# Patient Record
Sex: FEMALE | Race: WHITE | Employment: OTHER | URBAN - METROPOLITAN AREA
[De-identification: names, ages, dates, MRNs, and addresses within clinical notes are randomized per-mention and may not be internally consistent; named-entity substitution may affect disease eponyms.]

---

## 2020-10-19 ENCOUNTER — TELEPHONE (OUTPATIENT)
Dept: CARDIOLOGY | Age: 85
End: 2020-10-19

## 2020-10-20 NOTE — TELEPHONE ENCOUNTER
Patient called with BP still elevated. She took lisinopril 40 mg as advised. She also notes nausea, diarrhea and HA. Patient advised to come to ED for evaluation.       Araseli Cash NP

## 2022-07-29 ENCOUNTER — TELEPHONE (OUTPATIENT)
Dept: CARDIOLOGY CLINIC | Age: 87
End: 2022-07-29

## 2022-07-29 NOTE — TELEPHONE ENCOUNTER
Spoke to pt and pt's daughter about Dr. Maris Dailey' response. They thanked me and verbalized understanding.

## 2022-07-29 NOTE — TELEPHONE ENCOUNTER
----- Message from Rosie Saenz MD sent at 7/24/2022 10:21 PM EDT -----  Moderate bilateral ICA plaque but nothing to worry about.   Keep routine follow-up.  ----- Message -----  From: Raya Christensen DO  Sent: 7/23/2022   3:30 PM EDT  To: Rosie Saenz MD

## 2022-09-05 ASSESSMENT — ENCOUNTER SYMPTOMS
SORE THROAT: 0
ABDOMINAL PAIN: 0
CONSTIPATION: 0
DIARRHEA: 0
PHOTOPHOBIA: 0
ABDOMINAL DISTENTION: 0
SHORTNESS OF BREATH: 0
COUGH: 0

## 2022-09-05 NOTE — PROGRESS NOTES
[facility] CARDIOLOGY  [address], [address]  PHONE: [phone]        NAME:  Dennis Wilson  : 1932  MRN: [id number]     PCP:  Lydia Moura MD, MD      SUBJECTIVE:   Dennis Wilson is a 80 y.o. female seen for a follow up visit regarding the following:     Chief Complaint   Patient presents with    Coronary Artery Disease       HPI:    Doing well since our last office visit, without angina,CHF, edema, palpitations, presyncope or syncope. Blood pressure excellent today, still with intermittent postural symptoms but very mild severity and not frequent. Emphasized staying well-hydrated on a daily basis. Tolerating meds otherwise very well thus far. Haivng intermittent PVC's historically but clinically asymptomatic. Carotid duplex last year showed moderate right sided and mild left sided ICA disease with severe ECA stenosis (medical mgmt for this). No significant change 2020 and 2022 duplex. Echo 21:    LA: Left Atrium volume index is 32 mL/m2. TV: Mild tricuspid valve regurgitation is present. Right Ventricular Arterial Pressure (RVSP) is 25 mmHg. Pulmonary hypertension not suggested by Doppler findings. LV: Estimated LVEF is 60 - 65%. Normal cavity size, wall thickness and systolic function (ejection fraction normal). Wall motion: normal. Left ventricular diastolic dysfunction. MV: Mitral valve thickening. Mild mitral annular calcification. Mild mitral valve regurgitation is present. PV: Mild pulmonic valve regurgitation is present. Past Medical History, Past Surgical History, Family history, Social History, and Medications were all reviewed with the patient today and updated as necessary. Current Outpatient Medications   Medication Sig Dispense Refill    gabapentin (NEURONTIN) 100 MG capsule Take 100 mg by mouth 3 times daily.       aspirin 81 MG EC tablet Take by mouth daily      vitamin D 25 MCG (1000 UT) CAPS Take by mouth daily      Coenzyme Q10 100 MG TABS Take 200 mg by mouth      docusate (COLACE, DULCOLAX) 100 MG CAPS Take 100 mg by mouth 2 times daily      levothyroxine (SYNTHROID) 175 MCG tablet Take 150 mcg by mouth every morning (before breakfast)      lisinopril (PRINIVIL;ZESTRIL) 40 MG tablet Take 40 mg by mouth daily      metoprolol succinate (TOPROL XL) 25 MG extended release tablet TAKE 1 TABLET BY MOUTH EVERY DAY      nitroGLYCERIN (NITROSTAT) 0.4 MG SL tablet Place 0.4 mg under the tongue      polyethylene glycol (GLYCOLAX) 17 GM/SCOOP powder Take 17 g by mouth daily      zolpidem (AMBIEN) 10 MG tablet Take by mouth. No current facility-administered medications for this visit. Allergies   Allergen Reactions    Hydrocodone Other (See Comments)    Morphine Other (See Comments)     PT HIGHLY SENSITIVE TO DOSAGE - LAST POST-OP ADMIN NARCAN     Tramadol Other (See Comments)    Penicillins Itching and Rash    Sulfa Antibiotics Itching and Rash       Patient Active Problem List    Diagnosis Date Noted    S/P coronary artery stent placement 06/07/2016     Overview Note:     2.25 x 18 [place] 2010         Bilateral carotid artery stenosis without cerebral infarction 06/07/2016    PVC's (premature ventricular contractions) 05/02/2016    Cerebrovascular disease 05/02/2016    Chronic diastolic heart failure ([facility] [address] 05/02/2016    Bradycardia 05/02/2016    Hyperlipidemia 05/02/2016    Pulmonary nodules 04/07/2014     Overview Note:     CT-4/4/2014-  1. Irregular density in the superior aspect of the right lower lobe is   likely   an area of scarring or atelectasis. 2. 4 mm left lower lobe nodule and small pleural-based 5 mm right upper   lobe   nodule. A followup noncontrast CT is recommended in 6-12 months to   evaluate for   Stability. CT-10/6/2014-no change in nodules  CT Scan-10/07/2015-  1. Gross  stability  of  prior  noncalcified  nodules.   However,    multiple  new focal parenchymal  nodular  opacities  are  seen  although    some  imaging  features  Suggest that  these  are  inflammatory. The    largest  is  seen  in  the  right  lung apex measuring  11  mm. Based  on    the  size  and  the  presumed  inflammatory  etiology,followup  would  be    recommended  with  a  noncontrast  CT  scan  of  the  chest  in  3 months    to  ensure  improvement  or  Resolution. CT-1/15/2016-  Stable chronic changes in both lungs and resolution of the  inflammatory process in the right upper lobe. Abnormal pulmonary function test 04/07/2014     Overview Note:     The patient has a combined obstructive and restrictive defect on   spirometry. The flow volume loop has a normal pattern. The patient is   totally asymptomatic has never smoked. Colostomy care ([facility] [address] 04/07/2014     Overview Note:     2010-colectomy with abscess formation, mesh, multiple drains-prolonged   hospitalization [facility] and [facility]. Hypothyroid 66/41/6930    Diastolic dysfunction 51/17/8737    Essential hypertension, benign 11/25/2010    Coronary atherosclerosis of native coronary artery 11/25/2010    Paroxysmal ventricular tachycardia ([facility] [address] 11/25/2010        Past Surgical History:   Procedure Laterality Date    APPENDECTOMY      CARDIAC CATHETERIZATION  2010    GYN      HYSTERECTOMY    HEMORRHOID SURGERY      OTHER SURGICAL HISTORY      hemmoriodectomy    [place] ABDOMEN SURGERY PROC UNLISTED  2012, 2013    intestines     [place] ABDOMEN SURGERY PROC UNLISTED  2011    colon (multiple)    [place] CARDIAC SURG PROCEDURE UNLIST  2010    stent       Family History   Problem Relation Age of Onset    Post-op Cognitive Dysfunction Neg Hx     Other Neg Hx     Emergence Delirium Neg Hx     Post-op Nausea/Vomiting Neg Hx     Delayed Awakening Neg Hx     Pseudochol.  Deficiency Neg Hx     Malig Hypertherm Neg Hx         Social History     Tobacco Use    Smoking status: Never    Smokeless tobacco: Never   Substance Use Topics Alcohol use: No       ROS:    Review of Systems   Constitutional:  Negative for appetite change, chills, diaphoresis and fatigue. HENT:  Negative for congestion, mouth sores, nosebleeds, sore throat and tinnitus. Eyes:  Negative for photophobia and visual disturbance. Respiratory:  Negative for cough and shortness of breath. Cardiovascular:  Negative for chest pain, palpitations and leg swelling. Gastrointestinal:  Negative for abdominal distention, abdominal pain, constipation and diarrhea. Endocrine: Negative for cold intolerance, heat intolerance, polydipsia and polyuria. Genitourinary:  Negative for dysuria and hematuria. Musculoskeletal:  Negative for arthralgias, joint swelling and myalgias. Skin:  Negative for rash. Allergic/Immunologic: Negative for environmental allergies and food allergies. Neurological:  Negative for dizziness, seizures, syncope and light-headedness. Hematological:  Negative for adenopathy. Does not bruise/bleed easily. Psychiatric/Behavioral:  Negative for agitation, behavioral problems, dysphoric mood and hallucinations. The patient is not nervous/anxious. PHYSICAL EXAM:     Vitals:    09/08/22 1130   BP: 136/78   Pulse: 61   Weight: 129 lb (58.5 kg)   Height: 5' 2\" (1.575 m)      Wt Readings from Last 3 Encounters:   09/08/22 129 lb (58.5 kg)   07/19/22 137 lb (62.1 kg)   01/04/22 137 lb (62.1 kg)      BP Readings from Last 3 Encounters:   09/08/22 136/78   07/19/22 (!) 148/86   01/04/22 (!) 155/85        Physical Exam  Constitutional:       Appearance: Normal appearance. She is normal weight. HENT:      Head: Normocephalic and atraumatic. Nose: Nose normal.      Mouth/Throat:      Mouth: Mucous membranes are moist.      Pharynx: Oropharynx is clear. Eyes:      Extraocular Movements: Extraocular movements intact. Pupils: Pupils are equal, round, and reactive to light. Neck:      Vascular: Carotid bruit (high pitched left bruit) present. No JVD. Cardiovascular:      Rate and Rhythm: Normal rate and regular rhythm. Heart sounds: Murmur (soft MADELINE RUSB) heard. No friction rub. No gallop. Pulmonary:      Effort: Pulmonary effort is normal.      Breath sounds: Normal breath sounds. No wheezing or rhonchi. Abdominal:      General: Abdomen is flat. Bowel sounds are normal. There is no distension. Palpations: Abdomen is soft. Tenderness: There is no abdominal tenderness. Musculoskeletal:         General: No swelling. Normal range of motion. Cervical back: Normal range of motion and neck supple. No tenderness. Skin:     General: Skin is warm and dry. Neurological:      General: No focal deficit present. Mental Status: She is alert and oriented to person, place, and time. Mental status is at baseline. Psychiatric:         Mood and Affect: Mood normal.         Behavior: Behavior normal.        Medical problems and test results were reviewed with the patient today. No results found for: CHOL  No results found for: TRIG  No results found for: HDL  No results found for: LDLCHOLESTEROL, LDLCALC  No results found for: LABVLDL, VLDL  No results found for: CHOLHDLRATIO     No results found for: NA, K, CL, CO2, BUN, CREATININE, GLUCOSE, CALCIUM      No results for input(s): WBC, HGB, HCT, MCV, PLT in the last 720 hours. No results found for: LABA1C  No results found for: EAG     No results found for: BNP     No results found for: TSHFT4, TSH     Results for orders placed or performed in visit on 09/08/22   EKG 12 Lead    Impression    Sinus  Rhythm 61bpm  Normal axis  -Right bundle branch block. NSSTTW changes        ASSESSMENT and PLAN        Diagnoses and all orders for this visit:      Coronary artery disease - stable, no angina or CHF, continue meds      Essential hypertension, benign-today and stable at home per her report, minimize salt.  Walk daily for exercise      Chronic diastolic heart failure ([facility] [address] -

## 2022-09-08 ENCOUNTER — OFFICE VISIT (OUTPATIENT)
Dept: CARDIOLOGY CLINIC | Age: 87
End: 2022-09-08
Payer: MEDICARE

## 2022-09-08 VITALS
SYSTOLIC BLOOD PRESSURE: 136 MMHG | HEIGHT: 62 IN | BODY MASS INDEX: 23.74 KG/M2 | HEART RATE: 61 BPM | DIASTOLIC BLOOD PRESSURE: 78 MMHG | WEIGHT: 129 LBS

## 2022-09-08 DIAGNOSIS — I49.3 PVC'S (PREMATURE VENTRICULAR CONTRACTIONS): ICD-10-CM

## 2022-09-08 DIAGNOSIS — I65.23 BILATERAL CAROTID ARTERY STENOSIS WITHOUT CEREBRAL INFARCTION: ICD-10-CM

## 2022-09-08 DIAGNOSIS — I50.32 CHRONIC DIASTOLIC HEART FAILURE (HCC): Primary | ICD-10-CM

## 2022-09-08 DIAGNOSIS — E78.2 MIXED HYPERLIPIDEMIA: ICD-10-CM

## 2022-09-08 DIAGNOSIS — Z95.5 S/P CORONARY ARTERY STENT PLACEMENT: ICD-10-CM

## 2022-09-08 DIAGNOSIS — I25.10 ATHEROSCLEROSIS OF NATIVE CORONARY ARTERY OF NATIVE HEART WITHOUT ANGINA PECTORIS: ICD-10-CM

## 2022-09-08 DIAGNOSIS — I10 ESSENTIAL HYPERTENSION, BENIGN: ICD-10-CM

## 2022-09-08 PROCEDURE — 1090F PRES/ABSN URINE INCON ASSESS: CPT | Performed by: INTERNAL MEDICINE

## 2022-09-08 PROCEDURE — 99214 OFFICE O/P EST MOD 30 MIN: CPT | Performed by: INTERNAL MEDICINE

## 2022-09-08 PROCEDURE — G8427 DOCREV CUR MEDS BY ELIG CLIN: HCPCS | Performed by: INTERNAL MEDICINE

## 2022-09-08 PROCEDURE — G8420 CALC BMI NORM PARAMETERS: HCPCS | Performed by: INTERNAL MEDICINE

## 2022-09-08 PROCEDURE — 93000 ELECTROCARDIOGRAM COMPLETE: CPT | Performed by: INTERNAL MEDICINE

## 2022-09-08 PROCEDURE — 1036F TOBACCO NON-USER: CPT | Performed by: INTERNAL MEDICINE

## 2022-09-08 PROCEDURE — 1123F ACP DISCUSS/DSCN MKR DOCD: CPT | Performed by: INTERNAL MEDICINE

## 2022-09-08 RX ORDER — GABAPENTIN 100 MG/1
100 CAPSULE ORAL 3 TIMES DAILY
COMMUNITY

## 2023-04-25 ENCOUNTER — TELEPHONE (OUTPATIENT)
Dept: CARDIOLOGY CLINIC | Age: 88
End: 2023-04-25

## 2023-04-25 NOTE — TELEPHONE ENCOUNTER
Monet from [address] [place] [address] called on behalf of the the pt to report that the pt's //?. HR 62.  No SOB or CP

## 2023-04-25 NOTE — TELEPHONE ENCOUNTER
Claudia Mccarthy, RN [facility] Coordinator reports pt c/o high BP. Pt saw PCP yesterday. PCP ordered HHN. Doxycycline script for URI. VS in office 189/94/62  Advise keep VS Log x 1 week, 3 hrs after meds. Hydrate with 48-60 oz fluids daily, watch salt intake. Opening in EA tomorrow but since pt saw PCP yesterday, advise later f/u. Kathie agrees. Will review recs with pt, and return call with VS ~ 1wk. She will request [facility] also check pt's VS.   Dr. Jason Vigil may adjust meds based on VS log until he can see pt for FU. Kathie voiced understanding and thanks.    FU 6/23/23 11a Dr. Jason Vigil, TAYLOR. cgh

## 2023-04-27 ENCOUNTER — TELEPHONE (OUTPATIENT)
Dept: CARDIOLOGY CLINIC | Age: 88
End: 2023-04-27

## 2023-04-27 RX ORDER — VALSARTAN 80 MG/1
160 TABLET ORAL DAILY
COMMUNITY

## 2023-04-27 NOTE — TELEPHONE ENCOUNTER
Pt c/o elevated BP x ~1 wk. Slight headache. Denies CP, sob, edema. Pt c/o back pain from compression fx.     4/24  195/71/71  4/25  194/80/68 Pt took extra 1/2 Toprol 25mg  4/26  176/62/62  158/58/72  4/27  190/66/69    4/17/23 partial bowel obstruction  4/24/23 URI. Doxycycline. MEDS  TOPROL 25mg at night  VALSARTAN 80mg AM       UCD f/u 6/21/23 EA.   cgh

## 2023-04-27 NOTE — TELEPHONE ENCOUNTER
Dixie Browning, pt care manager with [facility] states that the pt called her and said her BP was 190/66. States that the pt has been having spikes all week, the pt has the other BP written down.  States the pt denies CP and SOB

## 2023-04-27 NOTE — TELEPHONE ENCOUNTER
Informed pt of Dr. Dimple Vallecillo' response. Pt voiced understanding with verb recall. Informed Lillie RN, [facility] Coordinator of call from pt, Dr. Dimple Vallecillo' response. Triage will talk to pt Tues for update. Kathie voiced understanding and thanks.  She will f/u with pt as well. cgh

## 2023-04-27 NOTE — TELEPHONE ENCOUNTER
Pain from bowel obstruction may be driving blood pressure. Stay on current dose of metoprolol as heart rate is well controlled but take valsartan 160 mg each morning and call me Monday with update on resting afternoon blood pressure and heart rate. Make sure she is sitting resting and relaxed when she checks it. Do not check vitals if in pain. Will be falsely elevated.

## 2023-05-02 ENCOUNTER — TELEPHONE (OUTPATIENT)
Dept: CARDIOLOGY CLINIC | Age: 88
End: 2023-05-02

## 2023-05-02 RX ORDER — AMLODIPINE BESYLATE 5 MG/1
5 TABLET ORAL DAILY
Qty: 90 TABLET | Refills: 3 | Status: SHIPPED | OUTPATIENT
Start: 2023-05-02

## 2023-05-02 NOTE — TELEPHONE ENCOUNTER
Add norvasc 5mg daily to current valsartan dosing  Take first dose today, then AM dosing  Allow 3-4 days to reach full effect. Minimize salt and caffeine  Recehck BP in 4-5 days after lunch after 5min rest.  Call with results of 4-05 days.

## 2023-05-02 NOTE — TELEPHONE ENCOUNTER
Called and informed pt of Dr. Florina Treadwell' response. Pt thanked and voiced understanding of instructions.

## 2023-05-02 NOTE — TELEPHONE ENCOUNTER
Called pt to check status. Had committed to pt call today. Pt states Dr. Josse Philip ordered another BP medication to take in addition to current meds. Someone from [facility] called and reviewed orders with her. She's wrenched her back again and the pain is causing her BP to be up. No further action at this time.  Pt voiced thanks for call.  cgh

## 2023-05-02 NOTE — TELEPHONE ENCOUNTER
Most recent BP readings:    4/28  189/84  4/29  180/55  5/1  174/59  5/2  207/58 Refuses ED, asx. All readings taken 3 hours after morning medications. Taking 2 valsartan pills as instructed.

## 2023-06-17 ASSESSMENT — ENCOUNTER SYMPTOMS
ABDOMINAL PAIN: 0
SORE THROAT: 0
SHORTNESS OF BREATH: 0
CONSTIPATION: 0
ABDOMINAL DISTENTION: 0
COUGH: 0
DIARRHEA: 0
PHOTOPHOBIA: 0

## 2023-06-21 ENCOUNTER — OFFICE VISIT (OUTPATIENT)
Age: 88
End: 2023-06-21
Payer: MEDICARE

## 2023-06-21 VITALS
SYSTOLIC BLOOD PRESSURE: 134 MMHG | DIASTOLIC BLOOD PRESSURE: 80 MMHG | HEART RATE: 64 BPM | BODY MASS INDEX: 22.82 KG/M2 | WEIGHT: 124 LBS | HEIGHT: 62 IN

## 2023-06-21 DIAGNOSIS — I65.23 BILATERAL CAROTID ARTERY STENOSIS WITHOUT CEREBRAL INFARCTION: ICD-10-CM

## 2023-06-21 DIAGNOSIS — I10 ESSENTIAL HYPERTENSION, BENIGN: ICD-10-CM

## 2023-06-21 DIAGNOSIS — I50.32 CHRONIC DIASTOLIC HEART FAILURE (HCC): Primary | ICD-10-CM

## 2023-06-21 DIAGNOSIS — R00.1 BRADYCARDIA: ICD-10-CM

## 2023-06-21 DIAGNOSIS — I47.29 PAROXYSMAL VENTRICULAR TACHYCARDIA (HCC): ICD-10-CM

## 2023-06-21 DIAGNOSIS — I49.3 PVC'S (PREMATURE VENTRICULAR CONTRACTIONS): ICD-10-CM

## 2023-06-21 DIAGNOSIS — I25.10 ATHEROSCLEROSIS OF NATIVE CORONARY ARTERY OF NATIVE HEART WITHOUT ANGINA PECTORIS: ICD-10-CM

## 2023-06-21 PROCEDURE — 99214 OFFICE O/P EST MOD 30 MIN: CPT | Performed by: INTERNAL MEDICINE

## 2023-06-21 PROCEDURE — G8427 DOCREV CUR MEDS BY ELIG CLIN: HCPCS | Performed by: INTERNAL MEDICINE

## 2023-06-21 PROCEDURE — G8420 CALC BMI NORM PARAMETERS: HCPCS | Performed by: INTERNAL MEDICINE

## 2023-06-21 PROCEDURE — 1090F PRES/ABSN URINE INCON ASSESS: CPT | Performed by: INTERNAL MEDICINE

## 2023-06-21 PROCEDURE — 1036F TOBACCO NON-USER: CPT | Performed by: INTERNAL MEDICINE

## 2023-06-21 PROCEDURE — 1123F ACP DISCUSS/DSCN MKR DOCD: CPT | Performed by: INTERNAL MEDICINE

## 2023-06-21 PROCEDURE — 93000 ELECTROCARDIOGRAM COMPLETE: CPT | Performed by: INTERNAL MEDICINE

## 2023-06-21 RX ORDER — BUSPIRONE HYDROCHLORIDE 10 MG/1
10 TABLET ORAL 2 TIMES DAILY PRN
COMMUNITY
Start: 2023-06-05

## 2023-06-21 RX ORDER — HYDROCODONE BITARTRATE AND ACETAMINOPHEN 5; 325 MG/1; MG/1
1-2 TABLET ORAL 2 TIMES DAILY PRN
COMMUNITY
Start: 2023-05-25

## 2023-08-11 ENCOUNTER — TELEPHONE (OUTPATIENT)
Age: 88
End: 2023-08-11

## 2023-08-11 NOTE — TELEPHONE ENCOUNTER
Pt c/o BLE edema. Discussed with PCP, who recommended pt stop amlodipine and increase metoprolol to 50 mg daily. Pt concerned about this as she states Dr. Maura Deshpande just finally got her BP under control and doesn't want to mess things up. Currently taking amlodipine 2.5 mg daily, metoprolol 25 mg daily, valsartan 160 mg daily.

## 2023-08-11 NOTE — TELEPHONE ENCOUNTER
Please call patient re: she has swelling in her feet. She went to PCP and he wanted to change her BP medication. She wants to know if Dr Nhan Ramires is okay with that.

## 2023-08-11 NOTE — TELEPHONE ENCOUNTER
I think it is fine to try that regimen recommended by her family doctor but if her heart rate drops below 50 bpm she needs to go back to what she is taking right now. Elevate legs whenever resting, minimize sodium, hold amlodipine for now.

## 2023-12-03 ASSESSMENT — ENCOUNTER SYMPTOMS
ABDOMINAL PAIN: 0
SORE THROAT: 0
DIARRHEA: 0
PHOTOPHOBIA: 0
SHORTNESS OF BREATH: 0
CONSTIPATION: 0
ABDOMINAL DISTENTION: 0
COUGH: 0

## 2023-12-03 NOTE — PROGRESS NOTES
Behavior: Behavior normal.          Medical problems and test results were reviewed with the patient today. No results found for: \"CHOL\"  No results found for: \"TRIG\"  No results found for: \"HDL\"  No results found for: \"LDLCHOLESTEROL\", \"LDLCALC\"  No results found for: \"LABVLDL\", \"VLDL\"  No results found for: \"CHOLHDLRATIO\"     No results found for: \"NA\", \"K\", \"CL\", \"CO2\", \"BUN\", \"CREATININE\", \"GLUCOSE\", \"CALCIUM\"      No results for input(s): \"WBC\", \"HGB\", \"HCT\", \"MCV\", \"PLT\" in the last 720 hours. No results found for: \"LABA1C\"  No results found for: \"EAG\"     No results found for: \"BNP\"     No results found for: \"TSHFT4\", \"TSH\"     Results for orders placed or performed in visit on 12/06/23   EKG 12 Lead    Impression    Sinus  Rhythm 75 bpm-First degree A-V block  - frequent multiform ectopic ventricular beats   Liliana = 226   # VECs = 4, # types 2  -Right bundle branch block.    -Left atrial enlargement. Poor R wave progression  Nonspecific ST-T wave changes with marked baseline wander and artifact        ASSESSMENT and PLAN        Diagnoses and all orders for this visit:      Coronary artery disease - stable, no angina or CHF, continue meds      Essential hypertension, benign-consistently moderately elevated. Severe swelling with amlodipine. Increase valsartan to 160 mg twice daily, check BMP and magnesium in 7 to 10 days and call me in a couple of weeks with an update on home BP. Permissive hypertension in the 140s fine with me given her extreme age. Chronic diastolic heart failure (HCC) - stable, continue meds/low salt diet. Elevate legs and minimize sodium. PVC's (premature ventricular contractions) - stable, clinically asymptomatic since her last visit, clinically asymptomatic- continue meds, call with worsening palpitations. Acquired hypothyroidism- stable, continue meds      Mixed hyperlipidemia-healthy diet encouraged. Previously on rosuvastatin.   Conservative therapy

## 2023-12-06 ENCOUNTER — OFFICE VISIT (OUTPATIENT)
Age: 88
End: 2023-12-06
Payer: MEDICARE

## 2023-12-06 VITALS
DIASTOLIC BLOOD PRESSURE: 84 MMHG | WEIGHT: 124 LBS | HEIGHT: 62 IN | SYSTOLIC BLOOD PRESSURE: 168 MMHG | HEART RATE: 75 BPM | BODY MASS INDEX: 22.82 KG/M2

## 2023-12-06 DIAGNOSIS — I65.23 BILATERAL CAROTID ARTERY STENOSIS WITHOUT CEREBRAL INFARCTION: ICD-10-CM

## 2023-12-06 DIAGNOSIS — I50.32 CHRONIC DIASTOLIC HEART FAILURE (HCC): Primary | ICD-10-CM

## 2023-12-06 DIAGNOSIS — I25.10 ATHEROSCLEROSIS OF NATIVE CORONARY ARTERY OF NATIVE HEART WITHOUT ANGINA PECTORIS: ICD-10-CM

## 2023-12-06 DIAGNOSIS — I49.3 PVC'S (PREMATURE VENTRICULAR CONTRACTIONS): ICD-10-CM

## 2023-12-06 DIAGNOSIS — I10 ESSENTIAL HYPERTENSION, BENIGN: ICD-10-CM

## 2023-12-06 PROCEDURE — 99214 OFFICE O/P EST MOD 30 MIN: CPT | Performed by: INTERNAL MEDICINE

## 2023-12-06 PROCEDURE — G8484 FLU IMMUNIZE NO ADMIN: HCPCS | Performed by: INTERNAL MEDICINE

## 2023-12-06 PROCEDURE — G8420 CALC BMI NORM PARAMETERS: HCPCS | Performed by: INTERNAL MEDICINE

## 2023-12-06 PROCEDURE — 1123F ACP DISCUSS/DSCN MKR DOCD: CPT | Performed by: INTERNAL MEDICINE

## 2023-12-06 PROCEDURE — G8427 DOCREV CUR MEDS BY ELIG CLIN: HCPCS | Performed by: INTERNAL MEDICINE

## 2023-12-06 PROCEDURE — 93000 ELECTROCARDIOGRAM COMPLETE: CPT | Performed by: INTERNAL MEDICINE

## 2023-12-06 PROCEDURE — 1036F TOBACCO NON-USER: CPT | Performed by: INTERNAL MEDICINE

## 2023-12-06 PROCEDURE — 1090F PRES/ABSN URINE INCON ASSESS: CPT | Performed by: INTERNAL MEDICINE

## 2023-12-06 RX ORDER — VALSARTAN 160 MG/1
160 TABLET ORAL 2 TIMES DAILY
Qty: 180 TABLET | Refills: 3 | Status: SHIPPED | OUTPATIENT
Start: 2023-12-06

## 2023-12-06 RX ORDER — METOPROLOL SUCCINATE 25 MG/1
25 TABLET, EXTENDED RELEASE ORAL DAILY
Qty: 90 TABLET | Refills: 3 | Status: SHIPPED | OUTPATIENT
Start: 2023-12-06

## 2024-01-03 NOTE — TELEPHONE ENCOUNTER
----- Message from Jc Solis MD sent at 1/3/2024 11:10 AM EST -----  BP log reviewed.  A bit high but overall getting close to where I need it.  Continue current meds, stay hydrated but minimize sodium.  Add amlodipine 2.5 mg each morning and keep routine follow-up.  This should do the trick.

## 2024-01-04 RX ORDER — AMLODIPINE BESYLATE 2.5 MG/1
2.5 TABLET ORAL EVERY MORNING
Qty: 90 TABLET | Refills: 3 | Status: SHIPPED | OUTPATIENT
Start: 2024-01-04

## 2024-01-04 NOTE — TELEPHONE ENCOUNTER
Requested Prescriptions     Pending Prescriptions Disp Refills    amLODIPine (NORVASC) 2.5 MG tablet 90 tablet 3     Sig: Take 1 tablet by mouth every morning

## 2024-06-12 ASSESSMENT — ENCOUNTER SYMPTOMS
ABDOMINAL DISTENTION: 0
SHORTNESS OF BREATH: 0
ABDOMINAL PAIN: 0
CONSTIPATION: 0
PHOTOPHOBIA: 0
DIARRHEA: 0
COUGH: 0
SORE THROAT: 0

## 2024-06-12 NOTE — PROGRESS NOTES
The estimated RVSP is 32 mmHg.   Pulmonic Valve Valve structure is normal. Mild regurgitation. No stenosis noted.   Aorta Normal sized aortic root and ascending aorta.   Pericardium No pericardial effusion.     Past Medical History, Past Surgical History, Family history, Social History, and Medications were all reviewed with the patient today and updated as necessary.     Current Outpatient Medications   Medication Sig Dispense Refill    amLODIPine (NORVASC) 2.5 MG tablet Take 1 tablet by mouth every morning 90 tablet 3    metoprolol succinate (TOPROL XL) 25 MG extended release tablet Take 1 tablet by mouth daily TAKE 1 TABLET BY MOUTH EVERY DAY 90 tablet 3    valsartan (DIOVAN) 160 MG tablet Take 1 tablet by mouth 2 times daily 180 tablet 3    HYDROcodone-acetaminophen (NORCO) 5-325 MG per tablet Take 1-2 tablets by mouth 2 times daily as needed.      busPIRone (BUSPAR) 10 MG tablet Take 1 tablet by mouth 2 times daily as needed      gabapentin (NEURONTIN) 100 MG capsule Take 1 capsule by mouth 3 times daily.      aspirin 81 MG EC tablet Take by mouth daily      vitamin D 25 MCG (1000 UT) CAPS Take by mouth daily      Coenzyme Q10 100 MG TABS Take 200 mg by mouth      docusate (COLACE, DULCOLAX) 100 MG CAPS Take 100 mg by mouth 2 times daily      levothyroxine (SYNTHROID) 175 MCG tablet Take 150 mcg by mouth every morning (before breakfast)      nitroGLYCERIN (NITROSTAT) 0.4 MG SL tablet Place 1 tablet under the tongue      polyethylene glycol (GLYCOLAX) 17 GM/SCOOP powder Take 17 g by mouth daily      zolpidem (AMBIEN) 10 MG tablet Take by mouth.       No current facility-administered medications for this visit.            Allergies   Allergen Reactions    Hydrocodone Other (See Comments)    Morphine Other (See Comments)     PT HIGHLY SENSITIVE TO DOSAGE - LAST POST-OP ADMIN NARCAN     Tramadol Other (See Comments)    Penicillins Itching and Rash    Sulfa Antibiotics Itching and Rash       Patient Active Problem

## 2024-06-13 ENCOUNTER — OFFICE VISIT (OUTPATIENT)
Age: 89
End: 2024-06-13
Payer: MEDICARE

## 2024-06-13 VITALS
SYSTOLIC BLOOD PRESSURE: 182 MMHG | WEIGHT: 123.6 LBS | HEIGHT: 62 IN | HEART RATE: 65 BPM | DIASTOLIC BLOOD PRESSURE: 64 MMHG | BODY MASS INDEX: 22.74 KG/M2

## 2024-06-13 DIAGNOSIS — I49.3 PVC'S (PREMATURE VENTRICULAR CONTRACTIONS): ICD-10-CM

## 2024-06-13 DIAGNOSIS — I10 ESSENTIAL HYPERTENSION, BENIGN: ICD-10-CM

## 2024-06-13 DIAGNOSIS — I50.32 CHRONIC DIASTOLIC HEART FAILURE (HCC): Primary | ICD-10-CM

## 2024-06-13 DIAGNOSIS — I25.10 ATHEROSCLEROSIS OF NATIVE CORONARY ARTERY OF NATIVE HEART WITHOUT ANGINA PECTORIS: ICD-10-CM

## 2024-06-13 DIAGNOSIS — R00.1 BRADYCARDIA: ICD-10-CM

## 2024-06-13 DIAGNOSIS — I65.23 BILATERAL CAROTID ARTERY STENOSIS WITHOUT CEREBRAL INFARCTION: ICD-10-CM

## 2024-06-13 PROCEDURE — 99214 OFFICE O/P EST MOD 30 MIN: CPT | Performed by: INTERNAL MEDICINE

## 2024-06-13 PROCEDURE — 1123F ACP DISCUSS/DSCN MKR DOCD: CPT | Performed by: INTERNAL MEDICINE

## 2024-06-13 PROCEDURE — 1090F PRES/ABSN URINE INCON ASSESS: CPT | Performed by: INTERNAL MEDICINE

## 2024-06-13 PROCEDURE — 1036F TOBACCO NON-USER: CPT | Performed by: INTERNAL MEDICINE

## 2024-06-13 PROCEDURE — G8420 CALC BMI NORM PARAMETERS: HCPCS | Performed by: INTERNAL MEDICINE

## 2024-06-13 PROCEDURE — 93000 ELECTROCARDIOGRAM COMPLETE: CPT | Performed by: INTERNAL MEDICINE

## 2024-06-13 PROCEDURE — G8427 DOCREV CUR MEDS BY ELIG CLIN: HCPCS | Performed by: INTERNAL MEDICINE

## 2024-08-06 ENCOUNTER — TELEPHONE (OUTPATIENT)
Age: 89
End: 2024-08-06

## 2024-08-06 NOTE — TELEPHONE ENCOUNTER
Patient at 2 am this morning she woke up and thought someone was shaking her bed, when she sat up her heart was racing like she was frightened her BP was 199/98 she keep taking /54 at 6 am and it dropped some, this morning not feeling well, is short of breath and feet swelling.

## 2024-08-06 NOTE — TELEPHONE ENCOUNTER
Pt states she awakened frightened in the middle of the night and BP was elevated 199/82. Sat up for a while and BP went down to 164/54. Now BP is more normal 159/69. C/o some edema in feet. Pt states she ate some chinese food last night. Denies SOB. Triage informed pt BP may have been elevated due to being frightened and high sodium meal. Likewise increase in swelling may be from high sodium meal. Encouraged hydration with water, elevation of feet and call our office back if symptoms worsen or do not improve.

## 2024-12-04 RX ORDER — METOPROLOL SUCCINATE 25 MG/1
25 TABLET, EXTENDED RELEASE ORAL DAILY
Qty: 90 TABLET | Refills: 3 | Status: SHIPPED | OUTPATIENT
Start: 2024-12-04

## 2024-12-04 NOTE — TELEPHONE ENCOUNTER
MEDICATION REFILL REQUEST      Name of Medication:  metoprolol succinate ER  Dose:  25 mg  Frequency:  QD  Quantity:  90  Days' supply:  90 with 3 refills      Pharmacy Name/Location:  [phone]

## 2024-12-04 NOTE — TELEPHONE ENCOUNTER
Requested Prescriptions     Pending Prescriptions Disp Refills    metoprolol succinate (TOPROL XL) 25 MG extended release tablet 90 tablet 3     Sig: Take 1 tablet by mouth daily TAKE 1 TABLET BY MOUTH EVERY DAY     Verified rx. Last OV 06/13/24. Erx to pharm on file.

## 2024-12-14 NOTE — PROGRESS NOTES
[facility] CARDIOLOGY  [address] [place] [address], [address]  [place] [address]  PHONE: [phone]        NAME:  Nataliya Gonzales  : 1932  MRN: [id number]     PCP:  Curt Jiang MD      SUBJECTIVE:   Nataliya Gonzales is a 89 y.o. female seen for a follow up visit regarding the following:     Chief Complaint   Patient presents with    Chronic diastolic heart failure       HPI:    Doing well since our last office visit, without angina, CHF, presyncope or syncope but having mild increase in dependent lower extremity edema, worse on the right, likely at least partially caused by calcium channel blocker/amlodipine therapy/chronic venous insufficiency. Blood pressure higher and moderately elevated at home and here today after cessation of amlodipine but edema is completely resolved now.  Emphasized staying well-hydrated on a daily basis. Tolerating meds otherwise very well thus far. Heart rate in the 60s-70s consistently. Blood pressure 120-150's at home (home BP log reviewed today).  Avoiding calcium channel blockers but augmented ARB to twice daily dosing at last visit.       Carotid duplex 2022 showed moderate right sided and mild left sided ICA disease with severe ECA stenosis (medical mgmt for this).  No significant change 2020 and 2022 duplex.   Remains asymptomatic.      Echocardiogram 2023:  Left Ventricle Normal left ventricular systolic function with a visually estimated EF of 55 - 60%. Left ventricle size is normal. Normal wall thickness. Normal wall motion. Abnormal diastolic function.   Left Atrium Left atrium is mildly dilated.   Right Ventricle Right ventricle size is normal. Normal systolic function.   Right Atrium Right atrium size is normal.   Aortic Valve Mild sclerosis of the aortic valve cusp. No regurgitation. No significant stenosis.   Mitral Valve Valve structure is normal. Mild regurgitation. No stenosis noted.   Tricuspid Valve Valve

## 2024-12-17 ENCOUNTER — OFFICE VISIT (OUTPATIENT)
Age: 88
End: 2024-12-17
Payer: MEDICARE

## 2024-12-17 VITALS
WEIGHT: 118.6 LBS | DIASTOLIC BLOOD PRESSURE: 76 MMHG | HEIGHT: 65 IN | HEART RATE: 56 BPM | BODY MASS INDEX: 19.76 KG/M2 | SYSTOLIC BLOOD PRESSURE: 138 MMHG

## 2024-12-17 DIAGNOSIS — I47.29 PAROXYSMAL VENTRICULAR TACHYCARDIA (HCC): ICD-10-CM

## 2024-12-17 DIAGNOSIS — I10 ESSENTIAL HYPERTENSION, BENIGN: Primary | ICD-10-CM

## 2024-12-17 DIAGNOSIS — I49.3 PVC'S (PREMATURE VENTRICULAR CONTRACTIONS): ICD-10-CM

## 2024-12-17 DIAGNOSIS — I25.10 ATHEROSCLEROSIS OF NATIVE CORONARY ARTERY OF NATIVE HEART WITHOUT ANGINA PECTORIS: ICD-10-CM

## 2024-12-17 DIAGNOSIS — I65.23 BILATERAL CAROTID ARTERY STENOSIS WITHOUT CEREBRAL INFARCTION: ICD-10-CM

## 2024-12-17 DIAGNOSIS — I51.89 DIASTOLIC DYSFUNCTION: ICD-10-CM

## 2024-12-17 PROCEDURE — 1126F AMNT PAIN NOTED NONE PRSNT: CPT | Performed by: INTERNAL MEDICINE

## 2024-12-17 PROCEDURE — G8484 FLU IMMUNIZE NO ADMIN: HCPCS | Performed by: INTERNAL MEDICINE

## 2024-12-17 PROCEDURE — 1160F RVW MEDS BY RX/DR IN RCRD: CPT | Performed by: INTERNAL MEDICINE

## 2024-12-17 PROCEDURE — 99214 OFFICE O/P EST MOD 30 MIN: CPT | Performed by: INTERNAL MEDICINE

## 2024-12-17 PROCEDURE — G8420 CALC BMI NORM PARAMETERS: HCPCS | Performed by: INTERNAL MEDICINE

## 2024-12-17 PROCEDURE — G8427 DOCREV CUR MEDS BY ELIG CLIN: HCPCS | Performed by: INTERNAL MEDICINE

## 2024-12-17 PROCEDURE — 1090F PRES/ABSN URINE INCON ASSESS: CPT | Performed by: INTERNAL MEDICINE

## 2024-12-17 PROCEDURE — 1123F ACP DISCUSS/DSCN MKR DOCD: CPT | Performed by: INTERNAL MEDICINE

## 2024-12-17 PROCEDURE — 1159F MED LIST DOCD IN RCRD: CPT | Performed by: INTERNAL MEDICINE

## 2024-12-17 PROCEDURE — 1036F TOBACCO NON-USER: CPT | Performed by: INTERNAL MEDICINE

## 2024-12-17 RX ORDER — METOPROLOL SUCCINATE 25 MG/1
25 TABLET, EXTENDED RELEASE ORAL DAILY
Qty: 90 TABLET | Refills: 3 | Status: SHIPPED | OUTPATIENT
Start: 2024-12-17

## 2024-12-17 RX ORDER — VALSARTAN 160 MG/1
160 TABLET ORAL 2 TIMES DAILY
Qty: 180 TABLET | Refills: 3 | Status: SHIPPED | OUTPATIENT
Start: 2024-12-17

## 2024-12-17 RX ORDER — AMLODIPINE BESYLATE 2.5 MG/1
2.5 TABLET ORAL EVERY MORNING
Qty: 90 TABLET | Refills: 3 | Status: SHIPPED | OUTPATIENT
Start: 2024-12-17

## 2025-01-08 ENCOUNTER — TELEPHONE (OUTPATIENT)
Age: 89
End: 2025-01-08

## 2025-02-18 ENCOUNTER — TELEPHONE (OUTPATIENT)
Age: 89
End: 2025-02-18

## 2025-02-18 NOTE — TELEPHONE ENCOUNTER
Spoke to Shwetha, pt was started on Eliquis 2.5 BID Spironolactone 25mg daily, Metoprolol was increased from 25mg to 50mg daily. Pt is concerned with the medication changes made by the hospitalist. Pt is unsure why she need to start new medications. Please advise if/why pt needs to be on the medications.     Also hospital note indicates pt needs f/u. Please advise how soon pt needs to be seen.

## 2025-02-18 NOTE — TELEPHONE ENCOUNTER
Spoke to Shwetha and informed her of provider response.       Spoke with pt's daughter and scheduled f/u for 03/25/25.

## 2025-02-18 NOTE — TELEPHONE ENCOUNTER
I cannot see any recent notes.I am not sure Care Everywhere is working appropriately but it looks like she may have been in atrial fibrillation.  If she is feeling well and having no bleeding, bruising, chest pain, or palpitations I would certainly continue meds.  Stay well-hydrated and keep follow-up with me to discuss further.  If she feels that she is intolerant to any of these meds she needs to let me know

## 2025-03-23 NOTE — PROGRESS NOTES
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 The flow volume loop has a normal pattern. The patient is   totally asymptomatic has never smoked.        Colostomy care (HCC) 04/07/2014     Priority: Low     Overview Note:     2010-colectomy with abscess formation, mesh, multiple drains-prolonged   hospitalization [facility] and [facility].        Hypothyroid 04/07/2014     Priority: Low    Diastolic dysfunction 04/07/2014     Priority: Low    Essential hypertension, benign 11/25/2010     Priority: Low    Coronary atherosclerosis of native coronary artery 11/25/2010     Priority: Low    Paroxysmal ventricular tachycardia (HCC) 11/25/2010     Priority: Low        Past Surgical History:   Procedure Laterality Date    APPENDECTOMY      CARDIAC CATHETERIZATION  2010    GYN      HYSTERECTOMY    HEMORRHOID SURGERY      OTHER SURGICAL HISTORY      hemmoriodectomy    [place] UNLISTED PROCEDURE ABDOMEN PERITONEUM & OMENTUM  2012, 2013    intestines     IA UNLISTED PROCEDURE ABDOMEN PERITONEUM & OMENTUM  2011    colon (multiple)    [place] UNLISTED PROCEDURE CARDIAC SURGERY  2010    stent       Family History   Problem Relation Age of Onset    Post-op Cognitive Dysfunction Neg Hx     Other Neg Hx     Emergence Delirium Neg Hx     Post-op Nausea/Vomiting Neg Hx     Delayed Awakening Neg Hx     Pseudochol. Deficiency Neg Hx     Malig Hypertherm Neg Hx         Social History     Tobacco Use    Smoking status: Never    Smokeless tobacco: Never   Substance Use Topics    Alcohol use: No       ROS:    Review of Systems   Constitutional:  Negative for appetite change, chills, diaphoresis and fatigue.   HENT:  Negative for congestion, mouth sores, nosebleeds, sore throat and tinnitus.    Eyes:  Negative for photophobia and visual disturbance.   Respiratory:  Negative for cough and shortness of breath.    Cardiovascular:  Positive for leg swelling.

## 2025-03-25 ENCOUNTER — OFFICE VISIT (OUTPATIENT)
Age: 89
End: 2025-03-25
Payer: MEDICARE

## 2025-03-25 VITALS
SYSTOLIC BLOOD PRESSURE: 124 MMHG | BODY MASS INDEX: 22.69 KG/M2 | HEIGHT: 61 IN | HEART RATE: 84 BPM | DIASTOLIC BLOOD PRESSURE: 70 MMHG | WEIGHT: 120.2 LBS

## 2025-03-25 DIAGNOSIS — I49.3 PVC'S (PREMATURE VENTRICULAR CONTRACTIONS): ICD-10-CM

## 2025-03-25 DIAGNOSIS — I50.32 CHRONIC DIASTOLIC HEART FAILURE (HCC): ICD-10-CM

## 2025-03-25 DIAGNOSIS — I65.23 BILATERAL CAROTID ARTERY STENOSIS WITHOUT CEREBRAL INFARCTION: ICD-10-CM

## 2025-03-25 DIAGNOSIS — R00.1 BRADYCARDIA: ICD-10-CM

## 2025-03-25 DIAGNOSIS — I25.10 ATHEROSCLEROSIS OF NATIVE CORONARY ARTERY OF NATIVE HEART WITHOUT ANGINA PECTORIS: Primary | ICD-10-CM

## 2025-03-25 DIAGNOSIS — E78.2 MIXED HYPERLIPIDEMIA: ICD-10-CM

## 2025-03-25 DIAGNOSIS — I10 ESSENTIAL HYPERTENSION, BENIGN: ICD-10-CM

## 2025-03-25 PROCEDURE — 1159F MED LIST DOCD IN RCRD: CPT | Performed by: INTERNAL MEDICINE

## 2025-03-25 PROCEDURE — 1123F ACP DISCUSS/DSCN MKR DOCD: CPT | Performed by: INTERNAL MEDICINE

## 2025-03-25 PROCEDURE — G8420 CALC BMI NORM PARAMETERS: HCPCS | Performed by: INTERNAL MEDICINE

## 2025-03-25 PROCEDURE — 1160F RVW MEDS BY RX/DR IN RCRD: CPT | Performed by: INTERNAL MEDICINE

## 2025-03-25 PROCEDURE — 99214 OFFICE O/P EST MOD 30 MIN: CPT | Performed by: INTERNAL MEDICINE

## 2025-03-25 PROCEDURE — 1126F AMNT PAIN NOTED NONE PRSNT: CPT | Performed by: INTERNAL MEDICINE

## 2025-03-25 PROCEDURE — 1090F PRES/ABSN URINE INCON ASSESS: CPT | Performed by: INTERNAL MEDICINE

## 2025-03-25 PROCEDURE — G8427 DOCREV CUR MEDS BY ELIG CLIN: HCPCS | Performed by: INTERNAL MEDICINE

## 2025-03-25 PROCEDURE — 1036F TOBACCO NON-USER: CPT | Performed by: INTERNAL MEDICINE

## 2025-03-25 RX ORDER — FUROSEMIDE 20 MG/1
20 TABLET ORAL
COMMUNITY
Start: 2025-03-07

## 2025-05-20 ENCOUNTER — TELEPHONE (OUTPATIENT)
Age: 89
End: 2025-05-20

## 2025-05-20 NOTE — TELEPHONE ENCOUNTER
Minimize sodium intake, elevate legs whenever resting and relaxing  64 ounce fluid restriction daily  Increase Lasix to 40 mg every day, take a total of 40 mg this morning as well  Once swelling and shortness of breath is gone, go back to 20 mg daily maintenance dose, okay to increase to 40 mg daily on an as-needed basis in the future.  If this does not work and she continues to get worse she needs to go to the ER

## 2025-05-20 NOTE — TELEPHONE ENCOUNTER
Triage informed pt of Dr. Solis' response. She states she feels she is having good results from lasix 20mg today and it makes her so weak she does not feel she can take a higher dose. Currently she takes lasix 20mg 3x per week. Triage informed pt she may take lasix 20mg daily until swelling is gone and SOB is better. Pt will call our office back if needed and aware of ER precautions.

## 2025-05-20 NOTE — TELEPHONE ENCOUNTER
Pt calling in with concerns of bilateral feet swelling. Legs are slightly \"puffy\". States swelling does not go down at night. She had recent med changes end of Feb/beginning of March. When she was seen by Will in March he noted the changes were ok. She couldn't give me the details of changes. She thinks it was the Valsartan

## 2025-05-20 NOTE — TELEPHONE ENCOUNTER
Pt c/o pitting edema in feet and alf up calves with increased SOB.  Did not improve overnight; the worst that swelling has been.  Weight today is 120 lbs.  Took lasix 20mg this morning.  /57 HR 81    Asking if she should do anything differently.  Triage will review with Dr. Solis and call pt back with his response.

## 2025-06-11 ENCOUNTER — TELEPHONE (OUTPATIENT)
Age: 89
End: 2025-06-11

## 2025-06-11 NOTE — TELEPHONE ENCOUNTER
Pt states since she has started valsartan she is extremely tired and has noticed her feet are getting \"puffy\". Pt states she does take a fluid pill.

## 2025-06-11 NOTE — TELEPHONE ENCOUNTER
Hold the amlodipine for a week as well.  Maybe this will help mobilize some fluid and also give her a little more energy.  If she notices that she has more energy next week after holding amlodipine, just discontinue it completely

## 2025-06-11 NOTE — TELEPHONE ENCOUNTER
Patient called with Dr Solis' response. Patient voiced understanding and thanked me med list updated//brendab

## 2025-06-11 NOTE — TELEPHONE ENCOUNTER
Patient called stating that she has been having fatigue, SOB, feet a little \"puffy\". Keeps feet up when sitting, takes valsartan 160 mg twice a day, lasix 20 mg 3 times a week, amlodipine 2.5 mg, metoprolol 50 mg .Last took Lasix 2 days ago and plans to take it today. Patient feels that the Valsartan is making her have fatigue.   Blood pressure readings  124/78  152/76  122/74  157/52  162/67  Patient informed that per note on 5-20-25, she can take Lasix 40 mg until SOB and swelling is gone. Patient states that she does not feel that she can take Lasix 40 mg as she is very weak. Patient states that she will take her regular lasix 20 mg now.    Last office visit 3-25-25

## 2025-06-18 ENCOUNTER — TELEPHONE (OUTPATIENT)
Age: 89
End: 2025-06-18

## 2025-06-18 NOTE — TELEPHONE ENCOUNTER
Patient called stating she has the following issues :    Calling back for BB  See chart note 6/11    Please call and advise.

## 2025-06-18 NOTE — TELEPHONE ENCOUNTER
I doubt that this is anything cardiac related.  It could be medication induced (noncardiac meds).  I would recommend she discuss with her family physician.  Her blood pressure remains stable which may be a bit low for her age.  Stay off the amlodipine completely.  Continue monitoring blood pressure but try taking the [place] only once daily instead of twice daily.  If her pressure is 130s to 150s systolic I am okay with this at her age.  Otherwise continue meds as currently taking and discussed with PCP.

## 2025-06-18 NOTE — TELEPHONE ENCOUNTER
Patient called stating that she has continued to stay off  amlodipine, still feels weak and nauseated. Blood pressure 123/73  138/58 patient is hydrating with water and has drank some Gatorade. Feels some better after drinking Gatorade.   Patient states that she wonders if the miralax could be causing her to feel this way. Patient states that she is 89 yo and is just frustrated that she cannot do like she use to do. Would like to know if there are any suggestions from Dr Solis.

## 2025-06-18 NOTE — TELEPHONE ENCOUNTER
Patient called with Dr Solis' response. Patient voiced understanding. Medication list updated//brendab

## 2025-08-14 ENCOUNTER — TELEPHONE (OUTPATIENT)
Age: 89
End: 2025-08-14

## 2025-08-14 DIAGNOSIS — R53.1 WEAKNESS: ICD-10-CM

## 2025-08-14 DIAGNOSIS — R53.83 FATIGUE: Primary | ICD-10-CM

## 2025-08-15 ENCOUNTER — OFFICE VISIT (OUTPATIENT)
Age: 89
End: 2025-08-15

## 2025-08-15 VITALS
DIASTOLIC BLOOD PRESSURE: 82 MMHG | HEART RATE: 93 BPM | SYSTOLIC BLOOD PRESSURE: 150 MMHG | HEIGHT: 65 IN | BODY MASS INDEX: 19.66 KG/M2 | WEIGHT: 118 LBS

## 2025-08-15 DIAGNOSIS — I10 ESSENTIAL HYPERTENSION, BENIGN: Primary | ICD-10-CM

## 2025-08-15 DIAGNOSIS — I49.3 PVC'S (PREMATURE VENTRICULAR CONTRACTIONS): ICD-10-CM

## 2025-08-15 ASSESSMENT — ENCOUNTER SYMPTOMS
CONSTIPATION: 0
PHOTOPHOBIA: 0
COUGH: 0
SHORTNESS OF BREATH: 0
ABDOMINAL DISTENTION: 0
ABDOMINAL PAIN: 0
SORE THROAT: 0
DIARRHEA: 0